# Patient Record
Sex: FEMALE | Race: WHITE | Employment: FULL TIME | URBAN - METROPOLITAN AREA
[De-identification: names, ages, dates, MRNs, and addresses within clinical notes are randomized per-mention and may not be internally consistent; named-entity substitution may affect disease eponyms.]

---

## 2017-02-17 RX ORDER — NORGESTIMATE-ETHINYL ESTRADIOL 7DAYSX3 28
TABLET ORAL
Qty: 28 TABLET | Refills: 0 | Status: SHIPPED | OUTPATIENT
Start: 2017-02-17 | End: 2017-03-15

## 2017-02-17 NOTE — TELEPHONE ENCOUNTER
1 fill sent, please have pt come in for PAP prior to further fills, her last one was abnormal and I don't see that she ever did the f/u testing, thanks

## 2017-02-17 NOTE — TELEPHONE ENCOUNTER
Patient returned phone call and notified of information. She states she will have to call back and schedule an appt.

## 2017-02-17 NOTE — TELEPHONE ENCOUNTER
LOV- 4/7/2015  Last Pap 4/7/2015 LSIL- patient never came in for colpo  Last refill-   TRINESSA, 28, 0.18/0.215/0.25 MG-35 MCG Oral Tab 28 tablet 11 3/14/2016      Sig :  TAKE 1 TABLET BY MOUTH DAILY

## 2017-03-15 RX ORDER — NORGESTIMATE-ETHINYL ESTRADIOL 7DAYSX3 28
TABLET ORAL
Qty: 28 TABLET | Refills: 0 | Status: SHIPPED | OUTPATIENT
Start: 2017-03-15

## 2017-03-15 NOTE — TELEPHONE ENCOUNTER
Last refill on 2 17 2017 #28 with 0 refills  Patient was advised that she is due for an office visit prior to anymore refills  Patient has not made an appointment

## 2017-04-11 RX ORDER — NORGESTIMATE-ETHINYL ESTRADIOL 7DAYSX3 28
TABLET ORAL
Qty: 28 TABLET | Refills: 0 | OUTPATIENT
Start: 2017-04-11

## 2017-04-11 NOTE — TELEPHONE ENCOUNTER
Last OV 4-7-2015  Last PAP 4-7-2015, mildly abnormal, colposcopy was advised but not done  Per 2/17/17 refill encounter was advised she needs appt, did not call back to schedule  Per 3/15/17 message left for patient, no refills until she calls office, need

## 2018-10-19 ENCOUNTER — HOSPITAL ENCOUNTER (OUTPATIENT)
Age: 28
Discharge: HOME | End: 2018-10-19
Attending: EMERGENCY MEDICINE
Payer: COMMERCIAL

## 2018-10-19 VITALS
BODY MASS INDEX: 29.03 KG/M2 | HEART RATE: 105 BPM | RESPIRATION RATE: 16 BRPM | SYSTOLIC BLOOD PRESSURE: 120 MMHG | HEIGHT: 67 IN | OXYGEN SATURATION: 98 % | TEMPERATURE: 100 F | DIASTOLIC BLOOD PRESSURE: 80 MMHG | WEIGHT: 185 LBS

## 2018-10-19 DIAGNOSIS — R50.9 FEBRILE ILLNESS: ICD-10-CM

## 2018-10-19 DIAGNOSIS — R19.7 DIARRHEA OF PRESUMED INFECTIOUS ORIGIN: Primary | ICD-10-CM

## 2018-10-19 PROCEDURE — 81002 URINALYSIS NONAUTO W/O SCOPE: CPT | Performed by: EMERGENCY MEDICINE

## 2018-10-19 PROCEDURE — 99204 OFFICE O/P NEW MOD 45 MIN: CPT

## 2018-10-19 PROCEDURE — 81025 URINE PREGNANCY TEST: CPT | Performed by: EMERGENCY MEDICINE

## 2018-10-19 PROCEDURE — 99213 OFFICE O/P EST LOW 20 MIN: CPT

## 2018-10-19 RX ORDER — CIPROFLOXACIN 500 MG/1
500 TABLET, FILM COATED ORAL 2 TIMES DAILY
Qty: 6 TABLET | Refills: 0 | Status: SHIPPED | OUTPATIENT
Start: 2018-10-19 | End: 2018-10-22

## 2018-10-19 RX ORDER — ONDANSETRON 4 MG/1
4 TABLET, ORALLY DISINTEGRATING ORAL EVERY 8 HOURS PRN
Qty: 10 TABLET | Refills: 0 | Status: SHIPPED | OUTPATIENT
Start: 2018-10-19 | End: 2018-10-26

## 2018-10-19 NOTE — ED PROVIDER NOTES
Patient presents with:  Fatigue (constitutional, neurologic)    HPI:     Regine Nation is a 33year old female who presents with chief complaint of fever, body aches, diarrhea. Pt just returned from a trip to [facility] 4 days ago.   Felt fatigued the da Blood, Urine Trace-Intact (*)     All other components within normal limits   POCT PREGNANCY, URINE - Normal     MDM:     Suspect traveler's diarrhea vs viral source. Will rx ciprofloxacin and zofran.   Continue supportive care with NSAID/apap and fluid

## 2018-10-19 NOTE — ED INITIAL ASSESSMENT (HPI)
Pt with c/o body aches, fevers, poor appetite that started yesterday. Pt c/o diarrhea, chills and headaches. Took ibuprofen today. [place] just [place] from [place].   States had similar sx for one day while on vacation

## 2018-10-20 NOTE — ED NOTES
Smart ER text: patient states has been drinking fluids but diarrhea is worse, abdominal pain is worse and not feeling like she is hydrated enough.  Patient called via telephone, advised if symptoms have worsened and abdominal pain is worsening that she shou

## 2022-09-26 NOTE — TELEPHONE ENCOUNTER
Left message for the pt to call the office- advised no more refills until she calls the office Doxycycline Pregnancy And Lactation Text: This medication is Pregnancy Category D and not consider safe during pregnancy. It is also excreted in breast milk but is considered safe for shorter treatment courses.

## (undated) NOTE — LETTER
[facility] IN [place]  [phone] [id number]  Dept: [phone]  Dept Fax: [phone]         October 19, 2018    Patient: Harry Rhodes   YOB: 1990   Date of Visit: 10/19/2018       To Whom It May Concern: